# Patient Record
Sex: FEMALE | URBAN - METROPOLITAN AREA
[De-identification: names, ages, dates, MRNs, and addresses within clinical notes are randomized per-mention and may not be internally consistent; named-entity substitution may affect disease eponyms.]

---

## 2021-12-28 ENCOUNTER — VIRTUAL VISIT (OUTPATIENT)
Dept: FAMILY MEDICINE CLINIC | Age: 41
End: 2021-12-28
Payer: COMMERCIAL

## 2021-12-28 DIAGNOSIS — R68.89 INFLUENZA-LIKE SYMPTOMS: Primary | ICD-10-CM

## 2021-12-28 PROCEDURE — 99441 PR PHYS/QHP TELEPHONE EVALUATION 5-10 MIN: CPT | Performed by: NURSE PRACTITIONER

## 2021-12-28 ASSESSMENT — ENCOUNTER SYMPTOMS
COUGH: 0
ABDOMINAL PAIN: 0
DIARRHEA: 1
NAUSEA: 1
CHEST TIGHTNESS: 0
SORE THROAT: 0
SHORTNESS OF BREATH: 0
VOMITING: 1

## 2021-12-28 NOTE — PROGRESS NOTES
[facility] [address]    TELEHEALTH VISIT -- Audio Only     SUBJECTIVE:    Michael Velarde is a 46 y.o. female evaluated via telephone on 12/28/2021. Consent:  Ashleigh Dimas and/or health care decision maker is aware that she may receive a bill for this telephone service, depending on her insurance coverage, and has provided verbal consent to proceed: Yes    Documentation:  I communicated with the patient and/or health care decision maker about:  Chief Complaint   Patient presents with    Fatigue    Nausea & Vomiting     History of Present Illness:  Ashleigh Dimas is currently: exposed to COVID-19    Presenting symptoms: nausea, loose stools, headache, sweats, muscle pain, feels weak, mild nasal congestion. Symptoms began: 12/24/21, gradually improving. Max temperature in last 24 hrs: not checked. Patient denies: fever  chills, cough, sore throat, shortness of breath, sputum production, chest pain, abdominal pain, loss of smell, loss of taste. Exposure source: patient is a - exposed via co-workers + client whos parent is positive. Relevant PMH: no pertinent PMH. Non-smoker. No recent travel. Not vaccinated for COVID-19. Review of Systems   Constitutional: Positive for diaphoresis and fatigue. Negative for fever and unexpected weight change. HENT: Positive for congestion and postnasal drip. Negative for sore throat. Respiratory: Negative for cough, chest tightness and shortness of breath. Cardiovascular: Negative for chest pain and palpitations. Gastrointestinal: Positive for diarrhea, nausea and vomiting. Negative for abdominal pain and blood in stool. Musculoskeletal: Positive for myalgias. Neurological: Positive for weakness and headaches. Negative for syncope and light-headedness.        OBJECTIVE:     Vital Signs: (As obtained by: pt or caregiver)  Patient-Reported Vitals 12/28/2021   Patient-Reported Weight 210 lb   Patient-Reported Height 5ft 8in     Exam: N/A  (telehealth audio visit)     POC Testing Today: No results found for this visit on 12/28/21. TELEHEALTH ASSESSMENT & PLAN:   Details of this discussion including any medical advice provided:     46 y.o. female with improving nausea, loose stools, headache, myalgias and exposure to COVID-19.     1. Influenza-like symptoms  -     POCT COVID-19, Antigen; Future  -     POCT Influenza A/B; Future    - Labs as ordered. - Patient will return tomorrow for MA visit/ drive-up test.  - Analgesia, fever control with OTC acetaminophen prn.  - Supportive care reviewed- sips of clear fluids frequently, rest, advance to bland diet as tolerated, intranasal saline prn for congestion.  - See PCP for follow-up if symptoms do not continue to improve over the next 2-3 days. - Watch for sxs x 14 days after last exposure to covid-19. Return for follow-up with PCP if your symptoms do not continue to improve over the next few days. I affirm this is a Patient Initiated Episode with an Established Patient who has not had a related appointment within my department in the past 7 days or scheduled within the next 24 hours. Total Time: minutes: 5-10 minutes    TELEHEALTH EVALUATION -- Audio/Telephone (During EQQK-61 public health emergency)  This service was provided through telehealth, by FABIEN Potter CNP and the patient in his/her home via telephone call. 8:57 minutes were spent on the phone with patient. Provider performed history of present illness and review of systems. Diagnosis and treatment plan was discussed with patient. Pharmacy of choice was reviewed along with past medical history, medication allergies, and current medications. Education provided to patient or patient parents/guardian with current illness diagnosis as well as when to seek additional healthcare due to changing or for worsening symptoms. Patient voiced understanding.      Electronically signed by Blessing Patterson Remi Hung - CNP on 12/28/2021 at 12:51 PM

## 2022-02-10 ASSESSMENT — ENCOUNTER SYMPTOMS: BLOOD IN STOOL: 0

## 2022-02-11 NOTE — PATIENT INSTRUCTIONS
1. Rapid tests for COVID-19 and Flu ordered. 2. May return for drive-up test between 9a-6:30p M-F.  3. Continue supportive care- clear fluids, bland diet, treat pain/ fever with over the counter Tylenol and/or Motrin as long as not allergic or intolerant, upright position when sleeping + use a humidifier/ vaporizer to help with drainage at night, apply warm compresses to sinuses + use saline nasal spray as needed for comfort/ congestion. 4. Continue to watch for signs/ symptoms of illness until at least 14 days has passed since you were last around someone with COVID-19.  5. See your PCP for follow-up if these symptoms do not continue to improve over the next 2 to 3 days. Thank you for choosing us for your care. Patient Education        Viral Infections: Care Instructions  Overview     You don't feel well, but it's not clear what's causing it. You may have a viral infection. Viruses cause many illnesses, such as the common cold, influenza, fever, rashes, and the diarrhea, nausea, and vomiting that are symptoms of a stomach infection. You may wonder if antibiotic medicines could make you feel better. But antibiotics only treat infections caused by bacteria. They don't work on viruses. The good news is that viral infections usually aren't serious. Most will go away in a few days without medical treatment. In the meantime, there are a few things you can do to make yourself more comfortable. Follow-up care is a key part of your treatment and safety. Be sure to make and go to all appointments, and call your doctor if you are having problems. It's also a good idea to know your test results and keep a list of the medicines you take. How can you care for yourself at home? · Get plenty of rest if you feel tired. · Take an over-the-counter pain medicine if needed, such as acetaminophen (Tylenol), ibuprofen (Advil, Motrin), or naproxen (Aleve). Read and follow all instructions on the label.   · Be careful when taking over-the-counter cold or flu medicines and Tylenol at the same time. Many of these medicines have acetaminophen, which is Tylenol. Read the labels to make sure that you are not taking more than the recommended dose. Too much acetaminophen (Tylenol) can be harmful. · Drink plenty of fluids. If you have kidney, heart, or liver disease and have to limit fluids, talk with your doctor before you increase the amount of fluids you drink. · Stay home from work, school, and other public places while you have a fever. When should you call for help? Call 911 anytime you think you may need emergency care. For example, call if:    · You have severe trouble breathing.     · You passed out (lost consciousness). Call your doctor now or seek immediate medical care if:    · You seem to be getting much sicker.     · You have a new or higher fever.     · You have blood in your stools.     · You have new belly pain, or your pain gets worse.     · You have a new rash. Watch closely for changes in your health, and be sure to contact your doctor if:    · You start to get better and then get worse.     · You do not get better as expected. Where can you learn more? Go to https://[employer]. org and sign in to your [employer] account. Enter R119 in the [employer] box to learn more about \"Viral Infections: Care Instructions. \"     If you do not have an account, please click on the \"Sign Up Now\" link. Current as of: July 1, 2021               Content Version: 13.1  © 2006-2021 Healthwise, Incorporated. Care instructions adapted under license by [facility] ([facility]). If you have questions about a medical condition or this instruction, always ask your healthcare professional. [place] [address] any warranty or liability for your use of this information.

## 2023-12-14 ENCOUNTER — OFFICE VISIT (OUTPATIENT)
Dept: OBSTETRICS AND GYNECOLOGY | Facility: CLINIC | Age: 43
End: 2023-12-14
Payer: COMMERCIAL

## 2023-12-14 VITALS
DIASTOLIC BLOOD PRESSURE: 82 MMHG | HEIGHT: 68 IN | WEIGHT: 204 LBS | SYSTOLIC BLOOD PRESSURE: 122 MMHG | BODY MASS INDEX: 30.92 KG/M2

## 2023-12-14 DIAGNOSIS — Z01.419 WELL WOMAN EXAM WITH ROUTINE GYNECOLOGICAL EXAM: Primary | ICD-10-CM

## 2023-12-14 DIAGNOSIS — Z12.31 VISIT FOR SCREENING MAMMOGRAM: ICD-10-CM

## 2023-12-14 DIAGNOSIS — Z11.3 SCREEN FOR STD (SEXUALLY TRANSMITTED DISEASE): ICD-10-CM

## 2023-12-14 PROCEDURE — 99386 PREV VISIT NEW AGE 40-64: CPT | Performed by: ADVANCED PRACTICE MIDWIFE

## 2023-12-14 PROCEDURE — 87624 HPV HI-RISK TYP POOLED RSLT: CPT

## 2023-12-14 PROCEDURE — 87800 DETECT AGNT MULT DNA DIREC: CPT

## 2023-12-14 PROCEDURE — 1036F TOBACCO NON-USER: CPT | Performed by: ADVANCED PRACTICE MIDWIFE

## 2023-12-14 PROCEDURE — 87661 TRICHOMONAS VAGINALIS AMPLIF: CPT

## 2023-12-14 PROCEDURE — 88175 CYTOPATH C/V AUTO FLUID REDO: CPT

## 2023-12-14 RX ORDER — SERTRALINE HYDROCHLORIDE 50 MG/1
50 TABLET, FILM COATED ORAL DAILY
COMMUNITY

## 2023-12-14 RX ORDER — COPPER 313.4 MG/1
1 INTRAUTERINE DEVICE INTRAUTERINE ONCE
COMMUNITY

## 2023-12-14 RX ORDER — SPIRONOLACTONE 100 MG/1
100 TABLET, FILM COATED ORAL DAILY
COMMUNITY

## 2023-12-14 RX ORDER — PHENTERMINE HYDROCHLORIDE 37.5 MG/1
37.5 TABLET ORAL
COMMUNITY

## 2023-12-14 NOTE — PROGRESS NOTES
Subjective   Mariaelena Hansen is a 43 y.o. female who is here for a routine exam. No vaginal concerns at this time including abnormal discharge, odor or discomfort. She is sexually active with one male partner and has no concern for STI exposure. She has no pain or bleeding with sex. She denies bladder or bowel concerns.      Current contraception: IUD, Paragard   LMP: 11/20/2023  Last pap: 1/13/2016 NEG  History of abnormal Pap smear: yes - 20 years ago  Due for pap: yes - 2016  Family history of uterine or ovarian cancer: no  Family history of prostate cancer: no  Family history of pancreatic cancer: no  Family history of breast cancer: no  Last mammogram: 9/20/2023 NEG      OB History    No obstetric history on file.         Review of Systems    Objective   There were no vitals taken for this visit.    Physical Exam     Assessment/Plan   A&P --> 43 y.o. y.o woman for annual GYN exam.     - Health Maintenance -->  - Routine follow up with PCP for health maintenance examination encouraged, including TSH, cholesterol, and Vit. D evaluation.  Self breast awareness encouraged; concerning characteristics of breasts reviewed - Pt. will report general concerns, any adherent lumps, skin dimpling/puckering or color changes, and any nipple discharge.    Diet and exercise reviewed.     Pap 2028:- Reviewed ACOG and ASCCP guidelines with pt.        - Contraception Plan: IUD     - F/U 1 year or as needed.          Simona Betts MA

## 2023-12-14 NOTE — PROGRESS NOTES
"GYNECOLOGY PROGRESS NOTE    CC:  Patient here for her annual exam. Patient had an abnormal pap at the age of 21. Normal paps since. Patient requesting STI to be sent. Denies symptoms. Menses are monthly and regular. She has skipped 1 menses in the past. No breast issues. Mammogram 9/23 normal.   Chief Complaint   Patient presents with    Annual Exam     Last PAP 1/13/2016 NEG  Last mammogram 1/04/2022 NEG        HPI:  Mariaelena Hansen is here for a routine GYN examination.  No GYN c/o, no AUB.        Depression screen:  negative      ROS:  GI - no blood in BMs  URO - no hematuria  GYN - no AUB or vaginal discharge  PSYCH - mood OK        PHYSICAL EXAM:  /82 (BP Location: Right arm, Patient Position: Sitting, BP Cuff Size: Adult)   Ht 1.727 m (5' 8\")   Wt 92.5 kg (204 lb)   LMP 11/20/2023 (Approximate)   Breastfeeding No   BMI 31.02 kg/m²   GEN:  A&O, NAD  ABD:  NT/ND, soft, no palpable masses  URO:  normal urethra, no bladder TTP  GYN:  normal vulva and perineum w/o lesions or ulcers, normal vagina with white discharge or lesions, normal cervix without lesions or discharge or CMT,. Paraguard IUD strings noted.  uterus NT/NE, adnexa mobile and NT/NE  BREAST:  no masses or TTP, no skin lesions or nipple discharge  DERM:  no hirsutism or acne   PSYCH:  normal affect, non-anxious      IMPRESSION/PLAN:    A: normal gyn exam. Possible STI exposure. Scant white discharge. Normal mammogram  Plan: 1. Pap if wnl may repeat 1-5 years. 2. STI off the urine sent. 3. Follow up 1 year or prn.  Problem List Items Addressed This Visit    None  Visit Diagnoses       Well woman exam with routine gynecological exam        Relevant Orders    THINPREP PAP    Visit for screening mammogram        Screen for STD (sexually transmitted disease)                 Pap and HPV normal in 2016 no Hx of HGSIL, next due in toda.   ASCCP pap smear screening guidelines reviewed with the patient.    Mammogram up to date and normal.  "         Zainab Swan, APRN-CNM

## 2023-12-15 LAB
C TRACH RRNA SPEC QL NAA+PROBE: NEGATIVE
N GONORRHOEA DNA SPEC QL PROBE+SIG AMP: NEGATIVE
T VAGINALIS RRNA SPEC QL NAA+PROBE: NEGATIVE

## 2024-01-11 LAB
CYTOLOGY CMNT CVX/VAG CYTO-IMP: NORMAL
HPV HR 12 DNA GENITAL QL NAA+PROBE: NEGATIVE
HPV HR GENOTYPES PNL CVX NAA+PROBE: NEGATIVE
HPV16 DNA SPEC QL NAA+PROBE: NEGATIVE
HPV18 DNA SPEC QL NAA+PROBE: NEGATIVE
LAB AP HPV GENOTYPE QUESTION: YES
LAB AP HPV HR: NORMAL
LABORATORY COMMENT REPORT: NORMAL
PATH REPORT.TOTAL CANCER: NORMAL

## 2024-02-05 ENCOUNTER — HOSPITAL ENCOUNTER (EMERGENCY)
Facility: HOSPITAL | Age: 44
Discharge: HOME | End: 2024-02-05
Attending: EMERGENCY MEDICINE
Payer: COMMERCIAL

## 2024-02-05 VITALS
HEIGHT: 68 IN | SYSTOLIC BLOOD PRESSURE: 128 MMHG | RESPIRATION RATE: 16 BRPM | BODY MASS INDEX: 30.24 KG/M2 | DIASTOLIC BLOOD PRESSURE: 69 MMHG | HEART RATE: 85 BPM | OXYGEN SATURATION: 99 % | WEIGHT: 199.52 LBS | TEMPERATURE: 97.9 F

## 2024-02-05 DIAGNOSIS — M26.621 ARTHRALGIA OF RIGHT TEMPOROMANDIBULAR JOINT: Primary | ICD-10-CM

## 2024-02-05 PROCEDURE — 99283 EMERGENCY DEPT VISIT LOW MDM: CPT | Performed by: EMERGENCY MEDICINE

## 2024-02-05 RX ORDER — HYDROCODONE BITARTRATE AND ACETAMINOPHEN 5; 325 MG/1; MG/1
1 TABLET ORAL EVERY 4 HOURS PRN
Qty: 18 TABLET | Refills: 0 | Status: SHIPPED | OUTPATIENT
Start: 2024-02-05 | End: 2024-02-08

## 2024-02-05 RX ORDER — MELOXICAM 15 MG/1
15 TABLET ORAL DAILY
Qty: 5 TABLET | Refills: 0 | Status: SHIPPED | OUTPATIENT
Start: 2024-02-05 | End: 2024-02-10

## 2024-02-05 RX ORDER — METHYLPREDNISOLONE 4 MG/1
TABLET ORAL
Qty: 21 TABLET | Refills: 0 | Status: SHIPPED | OUTPATIENT
Start: 2024-02-05

## 2024-02-05 ASSESSMENT — COLUMBIA-SUICIDE SEVERITY RATING SCALE - C-SSRS
6. HAVE YOU EVER DONE ANYTHING, STARTED TO DO ANYTHING, OR PREPARED TO DO ANYTHING TO END YOUR LIFE?: NO
2. HAVE YOU ACTUALLY HAD ANY THOUGHTS OF KILLING YOURSELF?: NO
1. IN THE PAST MONTH, HAVE YOU WISHED YOU WERE DEAD OR WISHED YOU COULD GO TO SLEEP AND NOT WAKE UP?: NO

## 2024-02-05 ASSESSMENT — LIFESTYLE VARIABLES
HAVE PEOPLE ANNOYED YOU BY CRITICIZING YOUR DRINKING: NO
EVER FELT BAD OR GUILTY ABOUT YOUR DRINKING: NO
HAVE YOU EVER FELT YOU SHOULD CUT DOWN ON YOUR DRINKING: NO
EVER HAD A DRINK FIRST THING IN THE MORNING TO STEADY YOUR NERVES TO GET RID OF A HANGOVER: NO

## 2024-02-05 ASSESSMENT — PAIN - FUNCTIONAL ASSESSMENT: PAIN_FUNCTIONAL_ASSESSMENT: 0-10

## 2024-02-05 ASSESSMENT — PAIN SCALES - GENERAL: PAINLEVEL_OUTOF10: 9

## 2024-02-05 NOTE — ED PROVIDER NOTES
"HPI   Chief Complaint   Patient presents with    Jaw Pain     \"Here for right jaw pain that has been going on since had tooth pulled December 5th.  No sure if infection or what the heck is going on.:\"         History provided by:  Patient  History of Present Illness:  43-year-old female presents with right jaw pain over the past couple of months.  It has been going on ever since she had a tooth pulled in her right upper jaw.  She has been evaluated by the dentist as well as multiple urgent cares and has been on antibiotics.  Nothing seems to make it better.  No fever or chills.      PMFSH:   As per HPI, otherwise nurses notes reviewed in EMR.    Past Medical History:   Active Ambulatory Problems     Diagnosis Date Noted    No Active Ambulatory Problems     Resolved Ambulatory Problems     Diagnosis Date Noted    No Resolved Ambulatory Problems     Past Medical History:   Diagnosis Date    Personal history of (healed) traumatic fracture       Past Surgical History: History reviewed. No pertinent surgical history.   Family History: No family history on file.   Social History:    Social History     Socioeconomic History    Marital status: Single     Spouse name: Not on file    Number of children: Not on file    Years of education: Not on file    Highest education level: Not on file   Occupational History    Not on file   Tobacco Use    Smoking status: Never    Smokeless tobacco: Never   Vaping Use    Vaping Use: Never used   Substance and Sexual Activity    Alcohol use: Yes    Drug use: Never    Sexual activity: Yes     Partners: Male     Birth control/protection: I.U.D.   Other Topics Concern    Not on file   Social History Narrative    Not on file     Social Determinants of Health     Financial Resource Strain: Not on file   Food Insecurity: Not on file   Transportation Needs: Not on file   Physical Activity: Not on file   Stress: Not on file   Social Connections: Not on file   Intimate Partner Violence: Not on file "   Housing Stability: Not on file                          Kimberly Coma Scale Score: 15                  Patient History   Past Medical History:   Diagnosis Date    Personal history of (healed) traumatic fracture     History of fracture of upper extremity     History reviewed. No pertinent surgical history.  No family history on file.  Social History     Tobacco Use    Smoking status: Never    Smokeless tobacco: Never   Vaping Use    Vaping Use: Never used   Substance Use Topics    Alcohol use: Yes    Drug use: Never       Physical Exam   ED Triage Vitals [02/05/24 0954]   Temperature Heart Rate Respirations BP   36.6 °C (97.9 °F) 85 16 128/69      Pulse Ox Temp Source Heart Rate Source Patient Position   99 % Temporal Monitor Sitting      BP Location FiO2 (%)     Right arm --       Physical Exam  Physical Exam:    ED Triage Vitals [02/05/24 0954]   Temperature Heart Rate Respirations BP   36.6 °C (97.9 °F) 85 16 128/69      Pulse Ox Temp Source Heart Rate Source Patient Position   99 % Temporal Monitor Sitting      BP Location FiO2 (%)     Right arm --         Constitutional: Vital signs per nursing notes.  Well developed, well nourished.  No acute distress.    Psychiatric: alert and oriented to person, place, and time; no abnormalities of mood or affect; memory intact  Eyes: PERRL; conjunctivae and lids normal; EOMI  ENT: otoscopic exam of external canal and TM´s normal; nasal mucosa, turbinates, and septum normal; mouth, tongue, and pharynx normal; pharynx without edema, exudate, or injection; except tenderness to palpation at the right TMJ  Cardiovascular: symmetric pulses; no edema; normal capillary refill; distal pulses present  Neurological: normal speech; CN II-XII grossly intact; normal motor and sensory function; no nystagmus  Lymphatic: no adenopathy of neck  Musculoskeletal: normal gait and station; normal digits and nails; no gross tendon or ligament injury; normal to palpation; normal strength/tone;  neurovascular status intact; Skin: normal to inspection; normal to palpation; no rash      ED Course & MDM   Diagnoses as of 02/05/24 1016   Arthralgia of right temporomandibular joint       Medical Decision Making  Medical Decision Making:    Differential Diagnoses Considered: TMJ, cellulitis, otitis media, otitis externa, dental abscess     EKG:    Labs Reviewed - No data to display    No orders to display       Diagnostic testing considered:         Review of recent and relevant records:    ED Medication Administration:     Prescription Medication Consideration/Given:     Social Determinants of Health Significantly Affecting Care:      Summary:    /69 (02/05/24 0954)    Temp 36.6 °C (97.9 °F) (02/05/24 0954)    Pulse 85 (02/05/24 0954)   Resp 16 (02/05/24 0954)    SpO2 99 % (02/05/24 0954)      Abnormal Labs Reviewed - No data to display    After evaluating the patient, I suspect that she is likely suffering from inflammation of the TMJ joint on the right.  She will be treated with anti-inflammatory and pain medications.  I will recommend that she follow-up closely with her primary care provider.    I discussed the results and discharge plan with the patient and/or family/friend if present.  I emphasized the importance of follow up with the physician I referred them to in the timeframe recommended.  I explained reasons for the patient to return to the Emergency Department.  Questions were addressed.  The patient and/or family/friend expressed understanding.        Diagnoses as of 02/05/24 1016   Arthralgia of right temporomandibular joint         Procedure  Procedures     Charles VILLEDA MD  02/05/24 1016

## 2024-06-04 ENCOUNTER — OFFICE VISIT (OUTPATIENT)
Dept: OBSTETRICS AND GYNECOLOGY | Facility: CLINIC | Age: 44
End: 2024-06-04
Payer: COMMERCIAL

## 2024-06-04 VITALS
BODY MASS INDEX: 30.96 KG/M2 | WEIGHT: 204.3 LBS | HEIGHT: 68 IN | DIASTOLIC BLOOD PRESSURE: 84 MMHG | SYSTOLIC BLOOD PRESSURE: 120 MMHG

## 2024-06-04 DIAGNOSIS — N93.9 ABNORMAL UTERINE BLEEDING (AUB): Primary | ICD-10-CM

## 2024-06-04 DIAGNOSIS — Z11.3 SCREEN FOR STD (SEXUALLY TRANSMITTED DISEASE): ICD-10-CM

## 2024-06-04 PROCEDURE — 87661 TRICHOMONAS VAGINALIS AMPLIF: CPT

## 2024-06-04 PROCEDURE — 99213 OFFICE O/P EST LOW 20 MIN: CPT | Performed by: ADVANCED PRACTICE MIDWIFE

## 2024-06-04 PROCEDURE — 87591 N.GONORRHOEAE DNA AMP PROB: CPT

## 2024-06-04 PROCEDURE — 87491 CHLMYD TRACH DNA AMP PROBE: CPT

## 2024-06-04 NOTE — PROGRESS NOTES
"GYNECOLOGY PROGRESS NOTE          CC:   Patient here for a problem and not her annual. Her annual exam was done 12/23. Patient states over the last 3 months she has 2 menses per month. She was regular and only 1 menses per month. She also has pain with her menses now that cause her to feel sick and she has vomited due to the pain. Menses are heavy for both periods she has. No changes in medications/health issues. Has paragard IUD for years now. Patient requesting STI testing  Chief Complaint   Patient presents with    Abnormal Uterine Bleeding     Est pt visit.   States has had 2 periods a month. Has happened 3 times now. Also has pelvic pain on right side that will make her have vomiting. Only on the heaviest day of period         HPI:  Mariaelena Hansen is here with new complaint of 2 menses a month x 3 months.      ROS:  GYN - see HPI        PHYSICAL EXAM:  /84 (BP Location: Left arm, Patient Position: Sitting, BP Cuff Size: Adult)   Ht 1.727 m (5' 8\")   Wt 92.7 kg (204 lb 4.8 oz)   LMP 05/28/2024   BMI 31.06 kg/m²   GEN:  A&O, NAD  HEENT:  head HC/AT, no visible goiter  PSYCH:  normal affect, non-anxious      IMPRESSION/PLAN:    A: 2 menses per month x 3 months now. Heavy. Right sided pain with menses that causes her nausea and vomiting due to the pain.  Plan: 1. Pelvic sonogram. 2. Scheduled for a hysteroscopic exam. 3. STI culture sent off the urine.  Education: possible causes of bleeding, sonogram, hysteroscopic exam. Possible treatments - nothing, medications, Mirena IUD, ablation, hysteroscopic exam.   Problem List Items Addressed This Visit    None  Visit Diagnoses       Screening for cervical cancer        Encounter for screening mammogram for malignant neoplasm of breast                   Zainab Swan, [place]        "

## 2024-06-11 ENCOUNTER — HOSPITAL ENCOUNTER (OUTPATIENT)
Dept: RADIOLOGY | Facility: HOSPITAL | Age: 44
Discharge: HOME | End: 2024-06-11
Payer: COMMERCIAL

## 2024-06-11 DIAGNOSIS — N93.9 ABNORMAL UTERINE BLEEDING (AUB): ICD-10-CM

## 2024-06-11 PROCEDURE — 76856 US EXAM PELVIC COMPLETE: CPT

## 2024-06-11 PROCEDURE — 76856 US EXAM PELVIC COMPLETE: CPT | Performed by: RADIOLOGY

## 2024-06-11 PROCEDURE — 76830 TRANSVAGINAL US NON-OB: CPT | Performed by: RADIOLOGY

## 2024-06-25 ENCOUNTER — APPOINTMENT (OUTPATIENT)
Dept: OBSTETRICS AND GYNECOLOGY | Facility: CLINIC | Age: 44
End: 2024-06-25
Payer: COMMERCIAL

## 2024-06-25 VITALS
BODY MASS INDEX: 30.69 KG/M2 | DIASTOLIC BLOOD PRESSURE: 76 MMHG | HEIGHT: 68 IN | SYSTOLIC BLOOD PRESSURE: 114 MMHG | WEIGHT: 202.5 LBS

## 2024-06-25 DIAGNOSIS — Z30.09 COUNSELING FOR BIRTH CONTROL, INTRAUTERINE DEVICE: ICD-10-CM

## 2024-06-25 DIAGNOSIS — N80.03 ADENOMYOSIS: ICD-10-CM

## 2024-06-25 DIAGNOSIS — N84.0 ENDOMETRIAL POLYP: ICD-10-CM

## 2024-06-25 DIAGNOSIS — Z97.5 IUD (INTRAUTERINE DEVICE) IN PLACE: ICD-10-CM

## 2024-06-25 DIAGNOSIS — N94.6 DYSMENORRHEA: ICD-10-CM

## 2024-06-25 DIAGNOSIS — N93.9 ABNORMAL UTERINE BLEEDING (AUB): Primary | ICD-10-CM

## 2024-06-25 LAB — PREGNANCY TEST URINE, POC: NEGATIVE

## 2024-06-25 PROCEDURE — 81025 URINE PREGNANCY TEST: CPT | Performed by: OBSTETRICS & GYNECOLOGY

## 2024-06-25 PROCEDURE — 58558 HYSTEROSCOPY BIOPSY: CPT | Performed by: OBSTETRICS & GYNECOLOGY

## 2024-06-25 PROCEDURE — 88305 TISSUE EXAM BY PATHOLOGIST: CPT

## 2024-06-25 PROCEDURE — 99204 OFFICE O/P NEW MOD 45 MIN: CPT | Performed by: OBSTETRICS & GYNECOLOGY

## 2024-06-25 PROCEDURE — 0753T DGTZ GLS MCRSCP SLD LEVEL IV: CPT

## 2024-06-25 NOTE — ASSESSMENT & PLAN NOTE
-AUB-P/A  -Pap/HPV:  wnl in 2023  -US:  ?adenomyosis, ? Heterogeneous endometrium, 2.5 cm ROV simple cyst  -H/S:  with polypectomy/EMB done 6/2024, + IUD in place  -Treatment:  change out to Mirena IUD if symptoms persist

## 2024-06-25 NOTE — PROGRESS NOTES
"GYNECOLOGY PROGRESS NOTE WITH PROCEDURE        CC:     Chief Complaint   Patient presents with    Procedure     Est patient - H/S EMB  Preg test neg  Chaperone darrius doss ma    Follow-up     US results for AUB        HPI:  Mariaelena ALONDRA Hansen is here for discussion of US results and for office hysteroscopy with EMB for new AUB symptoms.      AUB symptoms include heavy periods (changing products every 2 hours) and doubling of periods with dysmenorrhea for 3 months.  Last period was more normal and not doubled, on day 2 currently of periods.   No new GYN complaints.  Has had Paragard IUD for 5-6 years, chose it over Mirena IUD as \"doesn't do well with hormonal BC\".        ROS:  GYN - see HPI      HISTORY:  History reviewed. No pertinent surgical history.      PHYSICAL EXAM:  /76 (BP Location: Left arm, Patient Position: Sitting)   Ht 1.727 m (5' 8\")   Wt 91.9 kg (202 lb 8 oz)   LMP 05/28/2024   BMI 30.79 kg/m²   GEN:  A&O, NAD  URO:  normal urethral meatus  GYN:  normal vulva and perineum w/o lesions or ulcers, normal vagina without discharge or lesions, normal cervix without lesions or discharge  PSYCH:  normal affect, non-anxious       RESULTS:    06/11/24 - US PELVIS TRANSABDOMINAL WITH TRANSVAGINAL    - Impression -  1.  Central uterine IUD appears to be appropriately positioned. No  abnormal endometrial thickening.  2. Mildly enlarged right ovary containing a dominant 2.5 cm anechoic  simple cyst which is likely physiologic/benign.  3. Blood flow demonstrated to both ovaries.     **I personally reviewed the pelvic ultrasound images and my impressions are mildly heterogeneous endometrium at fundus, + IUD in place, 2.5 cm simple ROV cyst, normal LOV, no free fluid, no fibroids, mildly heterogeneous uterine myometrium consistent with possible adenomyosis    LAB RESULTS:  12/14/23 - pap/HPV wnl  Lab Results   Component Value Date    WBC 7.2 01/12/2023    HGB 13.9 01/12/2023    HCT 42.3 01/12/2023    MCV 90 " 01/12/2023     01/12/2023     Lab Results   Component Value Date    TSH 1.26 01/12/2023         HYSTEROSCOPY WITH EMBPOLYPECTOMY OFFICE PROCEDURE NOTE  Patient's pre-procedure questions were answered and a written informed consent form was signed.   Patient was placed in lithotomy position on the procedure room table.  A speculum was placed in the vagina. The cervix was cleansed × 3 with Betadine.  A single-tooth tenaculum was placed on the anterior lip of the cervix.  A paracervical block of 10 mL of 0.25% marcaine was placed.   The [employer] Opal hysteroscope was then inserted into the endometrial cavity, normal saline was used for the hysteroscopic fluid medium.  The cavity distended well with evidence of endometrial polyps and Paragard IUD in place at fundus with no evidence of extension of IUD wings into myometrium.   The background endometrium appeared normal.  The resectoscope was placed through the hysteroscope and the polyps were then removed, as was background endometrial tissue. Procedure was then ended, the instruments removed from the uterus and vagina. The tenaculum site was hemostatic. The patient tolerated the procedure well.  Blood loss was minimal.      IMPRESSION/PLAN:    Problem List Items Addressed This Visit    None  Visit Diagnoses       Abnormal uterine bleeding (AUB)    -  Primary    Relevant Orders    POCT pregnancy, urine manually resulted (Completed)    Surgical Pathology Exam    Dysmenorrhea        Endometrial polyp        IUD (intrauterine device) in place        Adenomyosis        Counseling for birth control, intrauterine device              AUB:  Discussed with the patient the potential etiologies  (PALMCOEIN) for her AUB.  Suspected diagnosis is endometrial polyps and/or adenomyosis.  Most recent pap/HPV and pertinent lab results reviewed--all wnl.  Pelvic US as ordered by CNM reviewed aglong with images--?adenomyosis, heterogeneous endometrium with IUD in place, incidental 2.5  cm RT ovarian cyst/dominant follicle (not related to AUB/dysmenorrhea, no F/U or treatment needed).  Office H/S done today with polypectomy and EMB, IUD in correct place with both wing tips visible.  Additional treatment options discussed including changing Paragard IUD to Mirena progesterone IUD, endometrial ablation (may not be as effective in setting of dysmenorrhea symptoms with suspected adenomyosis) or definitive hysterectomy.  Patient willing to consider changing out her Paragard IUD to a Mirena IUD after counseling on non-systemic nature of the progesterone in the IUD.    Progesterone IUD consultation:  Mirena IUD counseling done.  Counseling done on potential for AUB and other AE reviewed.  H/O provided on Mirena IUD.  Will discuss change out to Mirena IUD further at F/U appt.       F/U in 2-3 weeks for virtual visit to discuss PATH results and to recheck AUB/dysmenorrhea symptoms.        Aaron Stoddard, DO

## 2024-07-04 LAB
LABORATORY COMMENT REPORT: NORMAL
PATH REPORT.FINAL DX SPEC: NORMAL
PATH REPORT.GROSS SPEC: NORMAL
PATH REPORT.RELEVANT HX SPEC: NORMAL
PATH REPORT.TOTAL CANCER: NORMAL

## 2024-07-16 ENCOUNTER — APPOINTMENT (OUTPATIENT)
Dept: OBSTETRICS AND GYNECOLOGY | Facility: CLINIC | Age: 44
End: 2024-07-16
Payer: COMMERCIAL

## 2024-07-16 DIAGNOSIS — N93.9 ABNORMAL UTERINE BLEEDING (AUB): Primary | ICD-10-CM

## 2024-07-16 DIAGNOSIS — Z97.5 IUD (INTRAUTERINE DEVICE) IN PLACE: ICD-10-CM

## 2024-07-16 DIAGNOSIS — Z30.09 COUNSELING FOR BIRTH CONTROL, INTRAUTERINE DEVICE: ICD-10-CM

## 2024-07-16 DIAGNOSIS — N84.0 ENDOMETRIAL POLYP: ICD-10-CM

## 2024-07-16 PROCEDURE — 99213 OFFICE O/P EST LOW 20 MIN: CPT | Performed by: OBSTETRICS & GYNECOLOGY

## 2024-07-16 NOTE — PROGRESS NOTES
GYNECOLOGY VIRTUAL VISIT PROGRESS NOTE          CC:     Chief Complaint   Patient presents with    Follow-up     EMB results        HPI:  Mariaelena Hansen is scheduled today for virtual visit to discuss test results.   Audio and Visual communication real-time were utilized and verbal consent was obtained for the encounter.    No new GYN complaints.  Did OK after office procedure.  Had a period and 2nd day had nausea and mouth watering like before.   Period still heavy like before.      ROS:  GYN - see HPI      PHYSICAL EXAM:  VS:  n/a (virtual visit)  GEN:  A&O, NAD  PSYCH:  normal affect, non-anxious      PATH RESULTS:  12/14/23 - pap/HPV wnl  6/25/24- EMB wnl/polypoid secretory endometrium      IMPRESSION/PLAN:    Problem List Items Addressed This Visit       Abnormal uterine bleeding (AUB) - Primary     Other Visit Diagnoses       Endometrial polyp        Counseling for birth control, intrauterine device        IUD (intrauterine device) in place              AUB:  S/P hysteroscopic polypectomy.  Path results benign polyp/endometrium--reviewed with the patient.  At this point time offered expectant management to see if periods normalize after polypectomy versus additional treatment.  Treatment options of progesterone-only BCPs, progesterone IUD, endometrial ablation, or definitive hysterectomy discussed--patient opts to see how her periods do and to see if additional treatment is needed in the future, patient declines need for additional treatment at this time.  AUB precautions reviewed.  Recommend change out of current ParaGard IUD (placed in 2018) to Mirena IUD for further treatment of HMB and for prevention of recurrent endometrial polyps--patient agreeable.      F/U in 1-2 months for Paragard IUD removal and Mirena IUD insertion.          Aaron Stoddard, DO

## 2024-08-27 ENCOUNTER — APPOINTMENT (OUTPATIENT)
Dept: OBSTETRICS AND GYNECOLOGY | Facility: CLINIC | Age: 44
End: 2024-08-27
Payer: COMMERCIAL

## 2024-08-27 VITALS
HEIGHT: 68 IN | DIASTOLIC BLOOD PRESSURE: 80 MMHG | WEIGHT: 207 LBS | SYSTOLIC BLOOD PRESSURE: 124 MMHG | BODY MASS INDEX: 31.37 KG/M2

## 2024-08-27 DIAGNOSIS — Z30.432 ENCOUNTER FOR IUD REMOVAL: Primary | ICD-10-CM

## 2024-08-27 DIAGNOSIS — Z30.430 ENCOUNTER FOR IUD INSERTION: ICD-10-CM

## 2024-08-27 LAB — PREGNANCY TEST URINE, POC: NEGATIVE

## 2024-08-27 PROCEDURE — 81025 URINE PREGNANCY TEST: CPT | Performed by: OBSTETRICS & GYNECOLOGY

## 2024-08-27 PROCEDURE — 58300 INSERT INTRAUTERINE DEVICE: CPT | Performed by: OBSTETRICS & GYNECOLOGY

## 2024-08-27 PROCEDURE — 58301 REMOVE INTRAUTERINE DEVICE: CPT | Performed by: OBSTETRICS & GYNECOLOGY

## 2024-08-27 NOTE — PROGRESS NOTES
Patient ID: Mariaelena Hansen is a 44 y.o. female here for Paragard IUD removal and Mirena IUD reinsertion due to AUB with current Paragard IUD.  Using IUD for contraception.  Patient declines need for cervical block/anesthesia for procedure.      Procedures    IUD REMOVAL PROCEDURE NOTE    Patient's pre-procedure questions were answered and a written informed consent form was signed.   Speculum placed in the vagina.  Cervix cleansed with Betadine.  IUD strings identified and were grasped, then with traction the IUD was removed without difficulty.  After removal the IUD was inspected and it was completely intact.  Patient tolerated the procedure well, there are no complications.  EBL minimal.      IUD INSERTION PROCEDURE NOTE    Patient's pre-procedure questions were answered and a written informed consent form was signed.   Urine HCG negative.  Speculum was  already in place in the vagina and the cervix had previously been cleansed with Betadine.  A single-tooth tenaculum was placed on the anterior lip of the cervix.  The uterus was sounded.  The introducer was then inserted up into the uterus to approximately a cm below the uterine fundus.  The IUD was then unloaded from the introducer at that location, and then the introducer was then removed.  The IUD strings were cut to 2.5 cm.  The tenaculum was removed from the cervix and the sites were hemostatic, the speculum was then removed.  The patient tolerated the procedure well.  There were no complications.  Blood loss was minimal.      Problem List Items Addressed This Visit    None  Visit Diagnoses       Encounter for IUD removal    -  Primary    Encounter for IUD insertion        Relevant Medications    levonorgestrel (Mirena) 21 mcg/24 hr (8 yrs) 52 mg IUD (Start on 8/27/2024 11:45 AM)    Other Relevant Orders    POCT pregnancy, urine manually resulted (Completed)          Progesterone IUD insertion:  Mirena IUD counseling done. Counseling done on potential for  AUB, other AE reviewed, risks of expulsion and perforation reviewed, failure rate and increased ectopic pregnancy risk if failure reviewed.  Paragard IUD removal and Mirena IUD reinsertion done today without complication, postprocedure instructions reviewed.      Aaron Stoddard, DO

## 2025-01-03 ENCOUNTER — HOSPITAL ENCOUNTER (OUTPATIENT)
Dept: RADIOLOGY | Facility: EXTERNAL LOCATION | Age: 45
Discharge: HOME | End: 2025-01-03

## 2025-01-03 DIAGNOSIS — R92.8 OTHER ABNORMAL AND INCONCLUSIVE FINDINGS ON DIAGNOSTIC IMAGING OF BREAST: ICD-10-CM

## 2025-01-16 ENCOUNTER — OFFICE VISIT (OUTPATIENT)
Dept: SURGERY | Facility: CLINIC | Age: 45
End: 2025-01-16
Payer: COMMERCIAL

## 2025-01-16 VITALS
SYSTOLIC BLOOD PRESSURE: 112 MMHG | HEIGHT: 68 IN | TEMPERATURE: 98.1 F | HEART RATE: 106 BPM | DIASTOLIC BLOOD PRESSURE: 71 MMHG | WEIGHT: 211 LBS | BODY MASS INDEX: 31.98 KG/M2

## 2025-01-16 DIAGNOSIS — K60.2 ANAL FISSURE: Primary | ICD-10-CM

## 2025-01-16 PROCEDURE — 3008F BODY MASS INDEX DOCD: CPT | Performed by: NURSE PRACTITIONER

## 2025-01-16 PROCEDURE — 99212 OFFICE O/P EST SF 10 MIN: CPT | Performed by: NURSE PRACTITIONER

## 2025-01-16 PROCEDURE — 99202 OFFICE O/P NEW SF 15 MIN: CPT | Performed by: NURSE PRACTITIONER

## 2025-01-16 PROCEDURE — 1036F TOBACCO NON-USER: CPT | Performed by: NURSE PRACTITIONER

## 2025-01-16 NOTE — PROGRESS NOTES
History Of Present Illness  Mariaelena Hansen is a 44 y.o. female presenting with hemorrhoidal issues.    She started having hemorrhoidal issues since the birth of her children. Over the past 2 years, the stool was very romario like with some bleeding.  She had sharp like pain with her BM's, like she is ripping something.  She will also have pain with passing gas.  She will have 1-2 soft BM's every day.  The stools are a little more thinner lately.  She will have to strain with her BM's.  She  does not sit long on the toilet to have a Bm.  No c/o any accidents or leakage of stool.  She can feel the hemorrhoids on the outside but they usually do not cause any issues.      Colonoscope  -no or any perianal surgeries.  She has had 2 vaginal births with no tears.       Past Medical History  She has a past medical history of Personal history of (healed) traumatic fracture.    Surgical History  She has no past surgical history on file.     Social History  She reports that she has never smoked. She has never used smokeless tobacco. She reports current alcohol use. She reports that she does not use drugs.    Family History  No family history on file.     Allergies  Patient has no known allergies.    Review of Systems   All other systems reviewed and are negative.       Physical Exam  Exam conducted with a chaperone present.   Constitutional:       Appearance: Normal appearance.   HENT:      Head: Normocephalic and atraumatic.   Pulmonary:      Effort: Pulmonary effort is normal.   Genitourinary:     Comments: She has a posterior midline anal fissure.  Musculoskeletal:         General: Normal range of motion.   Skin:     General: Skin is warm and dry.   Neurological:      General: No focal deficit present.      Mental Status: She is alert and oriented to person, place, and time.   Psychiatric:         Mood and Affect: Mood normal.         Behavior: Behavior normal.          Last Recorded Vitals  There were no vitals taken for this  visit.       Assessment/Plan   Mariaelena has a posterior midline anal fissure.  She will start using Nifedipine ointment 2-3x/day.  She will start taking Benefiber daily to keep her stools more soft.  She will continue to increase her fiber and water intake as well.  She will call with any issues and will follow up in 6 weeks if not better.       Stefanie Alexander, APRN-CNP

## 2025-01-23 ENCOUNTER — HOSPITAL ENCOUNTER (OUTPATIENT)
Dept: RADIOLOGY | Facility: HOSPITAL | Age: 45
Discharge: HOME | End: 2025-01-23
Payer: COMMERCIAL

## 2025-01-23 VITALS — HEIGHT: 68 IN | WEIGHT: 211 LBS | BODY MASS INDEX: 31.98 KG/M2

## 2025-01-23 DIAGNOSIS — R92.8 OTHER ABNORMAL AND INCONCLUSIVE FINDINGS ON DIAGNOSTIC IMAGING OF BREAST: ICD-10-CM

## 2025-01-23 PROCEDURE — 76642 ULTRASOUND BREAST LIMITED: CPT | Mod: RIGHT SIDE | Performed by: RADIOLOGY

## 2025-01-23 PROCEDURE — 77065 DX MAMMO INCL CAD UNI: CPT | Mod: RIGHT SIDE | Performed by: RADIOLOGY

## 2025-01-23 PROCEDURE — 76642 ULTRASOUND BREAST LIMITED: CPT | Mod: RT

## 2025-01-23 PROCEDURE — 77061 BREAST TOMOSYNTHESIS UNI: CPT | Mod: RIGHT SIDE | Performed by: RADIOLOGY

## 2025-01-23 PROCEDURE — 77061 BREAST TOMOSYNTHESIS UNI: CPT | Mod: RT

## 2025-04-09 ENCOUNTER — APPOINTMENT (OUTPATIENT)
Dept: OBSTETRICS AND GYNECOLOGY | Facility: CLINIC | Age: 45
End: 2025-04-09
Payer: COMMERCIAL

## 2025-04-09 VITALS
HEIGHT: 68 IN | DIASTOLIC BLOOD PRESSURE: 80 MMHG | WEIGHT: 211.2 LBS | BODY MASS INDEX: 32.01 KG/M2 | SYSTOLIC BLOOD PRESSURE: 110 MMHG

## 2025-04-09 DIAGNOSIS — Z11.3 SCREEN FOR STD (SEXUALLY TRANSMITTED DISEASE): ICD-10-CM

## 2025-04-09 DIAGNOSIS — R10.2 PELVIC PAIN: ICD-10-CM

## 2025-04-09 DIAGNOSIS — R10.2 PELVIC PAIN IN FEMALE: ICD-10-CM

## 2025-04-09 DIAGNOSIS — R82.90 ABNORMAL URINE ODOR: Primary | ICD-10-CM

## 2025-04-09 LAB
POC APPEARANCE, URINE: CLEAR
POC BILIRUBIN, URINE: NEGATIVE
POC BLOOD, URINE: NEGATIVE
POC COLOR, URINE: YELLOW
POC GLUCOSE, URINE: NEGATIVE MG/DL
POC KETONES, URINE: ABNORMAL MG/DL
POC LEUKOCYTES, URINE: ABNORMAL
POC NITRITE,URINE: NEGATIVE
POC PH, URINE: 5.5 PH
POC PROTEIN, URINE: ABNORMAL MG/DL
POC SPECIFIC GRAVITY, URINE: 1.02
POC UROBILINOGEN, URINE: 0.2 EU/DL
PREGNANCY TEST URINE, POC: NEGATIVE

## 2025-04-09 PROCEDURE — 81003 URINALYSIS AUTO W/O SCOPE: CPT | Performed by: OBSTETRICS & GYNECOLOGY

## 2025-04-09 PROCEDURE — 81025 URINE PREGNANCY TEST: CPT | Performed by: OBSTETRICS & GYNECOLOGY

## 2025-04-09 PROCEDURE — 1036F TOBACCO NON-USER: CPT | Performed by: OBSTETRICS & GYNECOLOGY

## 2025-04-09 PROCEDURE — 99214 OFFICE O/P EST MOD 30 MIN: CPT | Performed by: OBSTETRICS & GYNECOLOGY

## 2025-04-09 PROCEDURE — 3008F BODY MASS INDEX DOCD: CPT | Performed by: OBSTETRICS & GYNECOLOGY

## 2025-04-09 RX ORDER — KETOROLAC TROMETHAMINE 10 MG/1
10 TABLET, FILM COATED ORAL EVERY 6 HOURS PRN
Qty: 20 TABLET | Refills: 0 | Status: SHIPPED | OUTPATIENT
Start: 2025-04-09 | End: 2025-04-14

## 2025-04-09 NOTE — PROGRESS NOTES
"GYNECOLOGY PROGRESS NOTE        CC:    Chief Complaint   Patient presents with    Pelvic Pain     Est patient - c/o pelvic pain, urine has an odor and just found out her partner cheated on her.  Will order full STD panel per patient.  Chaperone darrius doss ma        HPI:  Mariaelena Hansen is her with a complaints of pelvic pain and urinary odor.  This has been going on for 1 to 2 weeks.  She denies any vaginal discharge other than the normal discharge that she gets, no vaginal odor or itching.  Pain is located more on her RT side.  Patient requesting full STD testing.   History of chlamydia in her 20s.    ROS:  GYN - see HPI        PHYSICAL EXAM:  /80 (BP Location: Left arm, Patient Position: Sitting)   Ht 1.727 m (5' 7.99\")   Wt 95.8 kg (211 lb 3.2 oz)   BMI 32.12 kg/m²   GEN:  A&O, NAD  ABD:  soft, NT/ND, no masses, no palpable inguinal hernia  URO:  normal urethra, bladder NT  GYN:  normal vulva and perineum w/o lesions or ulcers, vagina with normal pH physiologic-appearing discharge but no lesions, normal cervix without lesions or CMT, uterus NT/NE, adnexa mobile and NT/NE  LYMPH:  no inguinal lymphadenopathy  PSYCH:  normal affect, non-anxious      LAB RESULTS:  Lab Results   Component Value Date    HGBA1C 5.2 11/06/2024 121/4/23 pap/HPV wnl      IMPRESSION/PLAN:  Problem List Items Addressed This Visit    None  Visit Diagnoses       Abnormal urine odor    -  Primary    Pelvic pain        Relevant Orders    POCT UA Automated manually resulted    POCT pregnancy, urine manually resulted    Screen for STD (sexually transmitted disease)        Relevant Orders    C. trachomatis / N. gonorrhoeae, Amplified, Urogenital    Hepatitis B Surface Antigen    Hepatitis C Antibody    HIV 1/2 Antigen/Antibody Screen with Reflex to Confirmation    Syphilis Screen with Reflex    Trichomonas vaginalis, Amplified           Pelvic Pain:  Potential causes for pelvic pain discussed).  Abdominal exam benign, pelvic exam with " mild RT adnexal TTP but no palpable mass.   Vaginal pH normal, no vaginitis on examination.  UA/UCx/STD cultures ordered.  Will hold off on ordering pelvic US no STD and urine testing completed.   OTC NSAIDs/LUI/heat/ice PRN pain.  If cultures negative will order pelvic US.    Urinary odor:  Office UA with small LEUKS but otherwise normal, urine culture ordered.    STD exposure:  Examination normal.  STD cultures and full STD panel testing done.        Aaron Stoddard, DO

## 2025-04-10 LAB
C TRACH RRNA SPEC QL NAA+PROBE: NOT DETECTED
HBV SURFACE AG SERPL QL IA: NORMAL
HCV AB SERPL QL IA: NORMAL
HIV 1+2 AB+HIV1 P24 AG SERPL QL IA: NORMAL
N GONORRHOEA RRNA SPEC QL NAA+PROBE: NOT DETECTED
QUEST GC CT AMPLIFIED (ALWAYS MESSAGE): NORMAL
T PALLIDUM AB SER QL IA: NORMAL
T VAGINALIS RRNA SPEC QL NAA+PROBE: NOT DETECTED

## 2025-04-11 LAB — BACTERIA UR CULT: NORMAL

## 2025-04-14 LAB
HBV SURFACE AG SERPL QL IA: NORMAL
HCV AB SERPL QL IA: NORMAL
HIV 1+2 AB+HIV1 P24 AG SERPL QL IA: NORMAL
T PALLIDUM AB SER QL IA: NEGATIVE